# Patient Record
Sex: MALE | Race: WHITE | NOT HISPANIC OR LATINO
[De-identification: names, ages, dates, MRNs, and addresses within clinical notes are randomized per-mention and may not be internally consistent; named-entity substitution may affect disease eponyms.]

---

## 2019-11-19 PROBLEM — Z00.00 ENCOUNTER FOR PREVENTIVE HEALTH EXAMINATION: Status: ACTIVE | Noted: 2019-11-19

## 2019-11-21 ENCOUNTER — APPOINTMENT (OUTPATIENT)
Dept: MRI IMAGING | Facility: IMAGING CENTER | Age: 54
End: 2019-11-21
Payer: COMMERCIAL

## 2019-11-21 ENCOUNTER — OUTPATIENT (OUTPATIENT)
Dept: OUTPATIENT SERVICES | Facility: HOSPITAL | Age: 54
LOS: 1 days | End: 2019-11-21
Payer: COMMERCIAL

## 2019-11-21 DIAGNOSIS — Z00.8 ENCOUNTER FOR OTHER GENERAL EXAMINATION: ICD-10-CM

## 2019-11-21 PROCEDURE — 70552 MRI BRAIN STEM W/DYE: CPT

## 2019-11-21 PROCEDURE — A9585: CPT

## 2019-11-21 PROCEDURE — 70552 MRI BRAIN STEM W/DYE: CPT | Mod: 26

## 2021-03-23 ENCOUNTER — OUTPATIENT (OUTPATIENT)
Dept: OUTPATIENT SERVICES | Facility: HOSPITAL | Age: 56
LOS: 1 days | End: 2021-03-23
Payer: COMMERCIAL

## 2021-03-23 ENCOUNTER — APPOINTMENT (OUTPATIENT)
Dept: MRI IMAGING | Facility: IMAGING CENTER | Age: 56
End: 2021-03-23
Payer: COMMERCIAL

## 2021-03-23 DIAGNOSIS — Z00.8 ENCOUNTER FOR OTHER GENERAL EXAMINATION: ICD-10-CM

## 2021-03-23 PROCEDURE — 73721 MRI JNT OF LWR EXTRE W/O DYE: CPT | Mod: 26,RT

## 2021-03-23 PROCEDURE — 73721 MRI JNT OF LWR EXTRE W/O DYE: CPT

## 2023-04-01 ENCOUNTER — APPOINTMENT (OUTPATIENT)
Dept: MRI IMAGING | Facility: IMAGING CENTER | Age: 58
End: 2023-04-01
Payer: COMMERCIAL

## 2023-04-01 ENCOUNTER — OUTPATIENT (OUTPATIENT)
Dept: OUTPATIENT SERVICES | Facility: HOSPITAL | Age: 58
LOS: 1 days | End: 2023-04-01
Payer: COMMERCIAL

## 2023-04-01 DIAGNOSIS — Z00.8 ENCOUNTER FOR OTHER GENERAL EXAMINATION: ICD-10-CM

## 2023-04-01 PROCEDURE — 73721 MRI JNT OF LWR EXTRE W/O DYE: CPT

## 2023-04-01 PROCEDURE — 73721 MRI JNT OF LWR EXTRE W/O DYE: CPT | Mod: 26,LT

## 2023-07-02 ENCOUNTER — NON-APPOINTMENT (OUTPATIENT)
Age: 58
End: 2023-07-02

## 2023-07-03 ENCOUNTER — APPOINTMENT (OUTPATIENT)
Dept: ORTHOPEDIC SURGERY | Facility: CLINIC | Age: 58
End: 2023-07-03
Payer: COMMERCIAL

## 2023-07-03 VITALS — BODY MASS INDEX: 25.77 KG/M2 | WEIGHT: 180 LBS | HEIGHT: 70 IN

## 2023-07-03 DIAGNOSIS — M23.92 UNSPECIFIED INTERNAL DERANGEMENT OF LEFT KNEE: ICD-10-CM

## 2023-07-03 DIAGNOSIS — M17.12 UNILATERAL PRIMARY OSTEOARTHRITIS, LEFT KNEE: ICD-10-CM

## 2023-07-03 PROCEDURE — 99204 OFFICE O/P NEW MOD 45 MIN: CPT

## 2023-07-03 NOTE — HISTORY OF PRESENT ILLNESS
[de-identified] : 58 year old male presents complaining of left knee pain that started in March 2023. He denies specific injury. He states that he has been quite active, including cycling. He is unsure if this activity contributed to his pain. He states that his pain was significant initially, especially on the lateral aspect of his knee. He had pain with activity. He denies having buckling or swelling. He saw an Orthopedist, Dr. Sepulveda, who gave him a cortisone injection in March. It has provided significant relief. He was sent for an MRI on 04/01/23. He was told that he has a lateral meniscal tear. He is unsure whether surgery is indicated but he would prefer to wait until the Fall. As of today, he is not experiencing pain. He was able to do several long distance bike rides over the past couple of weeks without any issues.Of note, he had an arthroscopy in each knee many years ago. \par PMHx: blood thinner, one stent \par \par MRI of the LEFT knee obtained at [facility] on 04/01/23 revealed:\par 1. Horizontal tear of the lateral meniscus involving the posterior horn, body, and anterior horn. Multiloculated parameniscal cysts extend into the anterolateral joint line.\par 2. Small knee joint effusion.\par 3. Osteochondral lesion of the anterior medial femoral condyle with adjacent cartilage delamination.\par 4. Postsurgical changes to the posterior horn of the medial meniscus with intrasubstance degeneration.

## 2023-07-03 NOTE — PHYSICAL EXAM
[de-identified] : Constitutional\par o Appearance : well-nourished, well developed, alert, in no acute distress \par Head and Face\par o Head :\par ¦ Inspection : atraumatic, normocephalic\par o Face :\par ¦ Inspection : no visible rash or discoloration\par Respiratory\par o Respiratory Effort: breathing unlabored \par Neurologic\par o Mental Status Examination :\par ¦ Orientation : grossly oriented to person, place and time\par Psychiatric\par o Mood and Affect: mood normal, affect appropriate \par \par Right Lower Extremity\par o Buttock : no tenderness, swelling or deformities \par o Right Hip :\par ¦ Inspection/Palpation : no tenderness, swelling or deformities\par ¦ Range of Motion : full and painless in all planes, no crepitance\par ¦ Stability : joint stability intact\par ¦ Strength : extension, flexion, adduction, abduction, internal rotation and external rotation 5/5 \par \par o Right Knee :\par ¦ Inspection/Palpation : no tenderness to palpation, no swelling, well-healed arthroscopic portals \par ¦ Range of Motion : 0-140 ctive flexion and extension full and painless, no crepitance\par ¦ Stability : no valgus or varus instability present on provocative testing\par ¦ Strength : flexion and extension 5/5\par ¦ Tests and Signs : Anterior Drawer negative, Lachman negative, Rebecca's negative\par \par Left Lower Extremity\par o Buttock : no tenderness, swelling or deformities \par o Left Hip :\par ¦ Inspection/Palpation : no tenderness, no swelling or deformities\par ¦ Range of Motion : full and painless in all planes, no crepitance\par ¦ Stability : joint stability intact\par ¦ Strength : extension, flexion, adduction, abduction, internal rotation and external rotation 5/5\par \par o Left Knee :\par ¦ Inspection/Palpation : no medial or lateral compartment  tenderness to palpation, no swelling\par ¦ Range of Motion : active flexion and extension full and painless, no crepitance, good patellofemoral glide \par ¦ Stability : no valgus or varus instability present on provocative testing\par ¦ Strength : flexion and extension 5/5\par ¦ Tests and Signs : Anterior Drawer negative, Lachman negative, Rebecca's negative\par \par Gait: normal gait, no significant extremity swelling or lymphedema\par \par Radiology Results \par o Left Knee : Standing AP, lateral and tunnel views of the knee were obtained and revealed moderate lateral patellofemoral arthritis

## 2023-07-03 NOTE — DISCUSSION/SUMMARY
[de-identified] : I discussed the underlying pathophysiology of the patient's condition in great detail with the patient. I went over the patient's x-rays with them in great detail. At-home strengthening exercises were discussed and demonstrated with the patient. He needs to avoid high-impact activities such as running and jumping or riding a treadmill. I recommend alternative activities such as riding a stationary bike or elliptical on low tension. He should focus on light weight and high repetition exercises. He should avoid squatting and kneeling. The benefits of pool exercises were discussed in detail with the patient. We discussed the use of ice, Tylenol and anti-inflammatories to relieve pain. No surgical intervention is recommended at this time. Viscosupplementation was discussed as a solution to the patient's symptoms and a booklet with information was provided. \par \par All of their questions were answered. They understand and consent to the plan. \par \par FU in 4 to 6 weeks.

## 2023-07-03 NOTE — ADDENDUM
[FreeTextEntry1] : I, NEMOWILLIAM GILLIAM, acted solely as a scribe for Dr. Elías Luong on this date 07/03/2023.\par \par All medical record entries made by the Scribe were at my, Dr. Elías Luong, direction and personally dictated by me on 07/03/2023. I have reviewed the chart and agree that the record accurately reflects my personal performance of the history, physical exam, assessment and plan. I have also personally directed, reviewed, and agreed with the chart.

## 2023-11-11 ENCOUNTER — OFFICE (OUTPATIENT)
Dept: URBAN - METROPOLITAN AREA CLINIC 90 | Facility: CLINIC | Age: 58
Setting detail: OPHTHALMOLOGY
End: 2023-11-11
Payer: COMMERCIAL

## 2023-11-11 DIAGNOSIS — H25.12: ICD-10-CM

## 2023-11-11 PROCEDURE — 92012 INTRM OPH EXAM EST PATIENT: CPT | Performed by: OPHTHALMOLOGY

## 2023-11-11 ASSESSMENT — SPHEQUIV_DERIVED
OS_SPHEQUIV: -9.125
OS_SPHEQUIV: -9
OS_SPHEQUIV: -9.125
OD_SPHEQUIV: 4.875
OD_SPHEQUIV: -9.125
OD_SPHEQUIV: -9.125

## 2023-11-11 ASSESSMENT — REFRACTION_CURRENTRX
OD_SPHERE: -8.50
OD_AXIS: 004
OD_CYLINDER: -1.00
OD_AXIS: 165
OS_CYLINDER: -0.75
OS_AXIS: 165
OD_CYLINDER: -0.75
OS_OVR_VA: 20/
OS_CYLINDER: -0.75
OS_OVR_VA: 20/
OS_SPHERE: -8.75
OS_AXIS: 160
OD_OVR_VA: 20/
OD_CYLINDER: -1.00
OD_VPRISM_DIRECTION: SV
OS_VPRISM_DIRECTION: SV
OD_VPRISM_DIRECTION: SV
OD_SPHERE: -8.75
OS_SPHERE: -8.75
OD_AXIS: 168
OS_VPRISM_DIRECTION: SV
OD_OVR_VA: 20/
OD_OVR_VA: 20/
OS_OVR_VA: 20/
OS_VPRISM_DIRECTION: SV
OS_SPHERE: -8.75
OD_SPHERE: -8.75
OS_CYLINDER: -0.75
OS_AXIS: 172
OD_VPRISM_DIRECTION: SV

## 2023-11-11 ASSESSMENT — REFRACTION_MANIFEST
OD_AXIS: 168
OS_ADD: +2.25
OS_AXIS: 165
OS_VA1: 20/20-2
OD_AXIS: 080
OS_CYLINDER: SPH
OS_AXIS: 080
OD_SPHERE: -8.75
OS_SPHERE: -8.75
OD_CYLINDER: SPH
OS_CYLINDER: +0.75
OS_SPHERE: -9.50
OD_CYLINDER: -0.75
OS_CYLINDER: -0.75
OD_CYLINDER: +0.75
OD_SPHERE: -9.50
OD_SPHERE: +1.75
OD_VA1: 20/NI
OS_SPHERE: +1.75

## 2023-11-11 ASSESSMENT — REFRACTION_AUTOREFRACTION
OD_AXIS: 088
OS_AXIS: 4
OD_SPHERE: +5.00
OD_CYLINDER: -0.25
OS_CYLINDER: -1.00
OS_SPHERE: -8.50

## 2023-11-11 ASSESSMENT — CONFRONTATIONAL VISUAL FIELD TEST (CVF)
OS_FINDINGS: FULL
OD_FINDINGS: FULL

## 2023-11-11 ASSESSMENT — CORNEAL DYSTROPHY - BAND KERATOPATHY: OD_BANDKERATOPATHY: TEMPORAL 1+ NASAL

## 2024-04-17 ENCOUNTER — APPOINTMENT (OUTPATIENT)
Dept: UROLOGY | Facility: CLINIC | Age: 59
End: 2024-04-17
Payer: COMMERCIAL

## 2024-04-17 DIAGNOSIS — N13.9 BENIGN PROSTATIC HYPERPLASIA WITH LOWER URINARY TRACT SYMPMS: ICD-10-CM

## 2024-04-17 DIAGNOSIS — Z78.9 OTHER SPECIFIED HEALTH STATUS: ICD-10-CM

## 2024-04-17 DIAGNOSIS — Z80.42 FAMILY HISTORY OF MALIGNANT NEOPLASM OF PROSTATE: ICD-10-CM

## 2024-04-17 DIAGNOSIS — N41.9 INFLAMMATORY DISEASE OF PROSTATE, UNSPECIFIED: ICD-10-CM

## 2024-04-17 DIAGNOSIS — N41.1 CHRONIC PROSTATITIS: ICD-10-CM

## 2024-04-17 DIAGNOSIS — Z86.79 PERSONAL HISTORY OF OTHER DISEASES OF THE CIRCULATORY SYSTEM: ICD-10-CM

## 2024-04-17 DIAGNOSIS — N40.1 BENIGN PROSTATIC HYPERPLASIA WITH LOWER URINARY TRACT SYMPMS: ICD-10-CM

## 2024-04-17 PROCEDURE — 51700 IRRIGATION OF BLADDER: CPT

## 2024-04-17 PROCEDURE — A4216: CPT | Mod: NC

## 2024-04-17 PROCEDURE — 99205 OFFICE O/P NEW HI 60 MIN: CPT | Mod: 25

## 2024-04-17 RX ORDER — DOXYCYCLINE 100 MG/1
100 CAPSULE ORAL TWICE DAILY
Qty: 42 | Refills: 1 | Status: ACTIVE | COMMUNITY
Start: 2024-04-17 | End: 1900-01-01

## 2024-04-17 RX ORDER — TAMSULOSIN HYDROCHLORIDE 0.4 MG/1
CAPSULE ORAL
Refills: 0 | Status: ACTIVE | COMMUNITY

## 2024-04-17 RX ORDER — ATORVASTATIN CALCIUM 80 MG/1
TABLET, FILM COATED ORAL
Refills: 0 | Status: ACTIVE | COMMUNITY

## 2024-04-17 RX ORDER — RAMIPRIL 5 MG/1
CAPSULE ORAL
Refills: 0 | Status: ACTIVE | COMMUNITY

## 2024-04-17 RX ORDER — ASPIRIN 325 MG
TABLET ORAL
Refills: 0 | Status: ACTIVE | COMMUNITY

## 2024-04-17 RX ORDER — NEBIVOLOL 20 MG/1
TABLET ORAL
Refills: 0 | Status: ACTIVE | COMMUNITY

## 2024-04-17 NOTE — PHYSICAL EXAM
[Normal Appearance] : normal appearance [Well Groomed] : well groomed [General Appearance - In No Acute Distress] : no acute distress [Edema] : no peripheral edema [Respiration, Rhythm And Depth] : normal respiratory rhythm and effort [Exaggerated Use Of Accessory Muscles For Inspiration] : no accessory muscle use [Abdomen Soft] : soft [Abdomen Tenderness] : non-tender [Costovertebral Angle Tenderness] : no ~M costovertebral angle tenderness [Normal Station and Gait] : the gait and station were normal for the patient's age [] : no rash [No Focal Deficits] : no focal deficits [Oriented To Time, Place, And Person] : oriented to person, place, and time [Mood] : the mood was normal [Affect] : the affect was normal

## 2024-04-18 PROBLEM — N41.9 PROSTATITIS: Status: ACTIVE | Noted: 2024-04-18

## 2024-04-18 PROBLEM — N40.1 BENIGN LOCALIZED HYPERPLASIA OF PROSTATE WITH URINARY OBSTRUCTION AND LOWER URINARY TRACT SYMPTOMS: Status: ACTIVE | Noted: 2024-04-16

## 2024-04-18 LAB
APPEARANCE: CLEAR
BACTERIA: NEGATIVE /HPF
BILIRUBIN URINE: NEGATIVE
BLOOD URINE: NEGATIVE
CAST: 0 /LPF
COLOR: YELLOW
EPITHELIAL CELLS: 0 /HPF
GLUCOSE QUALITATIVE U: NEGATIVE MG/DL
KETONES URINE: NEGATIVE MG/DL
LEUKOCYTE ESTERASE URINE: NEGATIVE
MICROSCOPIC-UA: NORMAL
NITRITE URINE: NEGATIVE
PH URINE: 6.5
PROTEIN URINE: NEGATIVE MG/DL
RED BLOOD CELLS URINE: 0 /HPF
SPECIFIC GRAVITY URINE: 1.01
UROBILINOGEN URINE: 0.2 MG/DL
WHITE BLOOD CELLS URINE: 0 /HPF

## 2024-04-18 NOTE — ASSESSMENT
[FreeTextEntry1] : 04/17/2024: Mr. TANIKA WONG is a 58 y/o M that presents today for urologic evaluation. Has previously seen a urologist, Dr.Jeffrey Hunt, and presents for a second opinion. He reports his most recent PSA was 1.83. Is not on finasteride or dutasteride. Is on tamsulosin 0.4 mg once daily, unsure if it is helping as he complains of increased urinary frequency. Nocturia x0-1. Denies urinary urgency but has some burning if he holds it too long. Drinks about 1 quart of water a day. He reports that throughout his life he has had numerous episodes of prostatitis, helped by abx usually. He went a few years without any episodes, however about a year ago he had an episode of prostatitis that came out of nowhere that has not gone away. Continues to have mild burning. Has an uncomfortable sensation when he sits. The pain is between the scrotum and the anus. Denies any pain or discomfort in the penis. Denies weak stream. He was treated numerous times with antibiotics that have not helped. Patient does ride a bike often. The seat is fairly hard but has been putting cushion on it lately. Usually, will feel discomfort in the prostate a day after riding his bike. His symptoms are not worse during times of high stress. +FHx of prostate cancer (maternal uncle). Pt is an x-ray technician. He is on atorvastatin for cholesterol. Denies constipation. Pt is allergic to sulfa, got a rash on his hand.   The patient produced a urine sample which will be sent for urinalysis, urine cytology, and urine culture.    Reviewed and discussed lab work of 12/27/2023 which the patient presented to me today. 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 Creatinine was 1.17. Alk phos was normal at 72. PSA was very good at 1.83.    Patient was given a prescription for pelvic floor PT by his previous urologist for overactive bladder. Pt was discouraged by this and feels he does not have the time for it. Based off his symptoms and the fact that his pain is mostly localized in the perineum and not throughout the pelvis, I do not believe his problem will be helped by pelvic floor PT at this time, and we should hold off on it for now.   Continue tamsulosin 0.4 mg once daily. Pt states he takes it before breakfast, and he does not eat a big breakfast. Pt was advised to take it 15-30 minutes after dinner for maximum absorption and benefit. I had suggested we add Desipramine, however the pt was hesitant on starting more medications. He requested that we try one more course of antibiotics which I found to be reasonable as his symptoms could be due to a persistent low grade infection.   I prescribed the patient doxycycline 100 mg, one capsule every 12 hours x 3 weeks. I informed the patient to avoid excessive amounts of direct sunlight while on this medication. If this works well in clearing things up, we might consider prophylaxis with daily trimethoprim.   Patient will RTO in 3 weeks after completion of abx for a renal and bladder US.    Preparation, in person office visit, and coordination of care took 60 minutes.

## 2024-04-18 NOTE — HISTORY OF PRESENT ILLNESS
[FreeTextEntry1] : 04/17/2024: Mr. TANIKA WONG is a 58 y/o M that presents today for urologic evaluation. Has previously seen a urologist, Dr.Jeffrey Hunt, and presents for a second opinion. He reports his most recent PSA was 1.83. Is not on finasteride or dutasteride. Is on tamsulosin 0.4 mg once daily, unsure if it is helping as he complains of increased urinary frequency. Nocturia x0-1. Denies urinary urgency but has some burning if he holds it too long. Drinks about 1 quart of water a day. He reports that throughout his life he has had numerous episodes of prostatitis, helped by abx usually. He went a few years without any episodes, however about a year ago he had an episode of prostatitis that came out of nowhere that has not gone away. Continues to have mild burning. Has an uncomfortable sensation when he sits. The pain is between the scrotum and the anus. Denies any pain or discomfort in the penis. Denies weak stream. He was treated numerous times with antibiotics that have not helped. Patient does ride a bike often. The seat is fairly hard but has been putting cushion on it lately. Usually, will feel discomfort in the prostate a day after riding his bike. His symptoms are not worse during times of high stress. +FHx of prostate cancer (maternal uncle). Pt is an x-ray technician. He is on atorvastatin for cholesterol. Denies constipation. Pt is allergic to sulfa, got a rash on his hand.

## 2024-04-18 NOTE — ADDENDUM
[FreeTextEntry1] : This note was authored by Ariane Berg working as a scribe for Dr.Gary Marie. I, Dr. Jorden Marie have reviewed the content of this note and confirm it is true and accurate. I personally performed the history and physical examination and made all the decisions 04/17/2024

## 2024-04-20 LAB
BACTERIA UR CULT: NORMAL
URINE CYTOLOGY: NORMAL

## 2025-07-17 ENCOUNTER — OFFICE (OUTPATIENT)
Facility: LOCATION | Age: 60
Setting detail: OPHTHALMOLOGY
End: 2025-07-17
Payer: COMMERCIAL

## 2025-07-17 DIAGNOSIS — H33.312: ICD-10-CM

## 2025-07-17 DIAGNOSIS — H16.223: ICD-10-CM

## 2025-07-17 DIAGNOSIS — H25.12: ICD-10-CM

## 2025-07-17 DIAGNOSIS — H33.8: ICD-10-CM

## 2025-07-17 DIAGNOSIS — H52.7: ICD-10-CM

## 2025-07-17 DIAGNOSIS — H35.40: ICD-10-CM

## 2025-07-17 PROCEDURE — 92012 INTRM OPH EXAM EST PATIENT: CPT | Performed by: OPHTHALMOLOGY

## 2025-07-17 ASSESSMENT — REFRACTION_MANIFEST
OD_SPHERE: -9.50
OS_CYLINDER: -0.75
OS_VA1: 20/20-2
OD_CYLINDER: SPH
OD_AXIS: 168
OS_CYLINDER: SPH
OS_CYLINDER: +0.75
OS_SPHERE: -9.50
OS_AXIS: 080
OD_CYLINDER: +0.75
OS_ADD: +2.25
OD_SPHERE: +1.75
OS_SPHERE: +1.75
OD_CYLINDER: -0.75
OD_SPHERE: -8.75
OD_VA1: 20/NI
OS_AXIS: 165
OS_SPHERE: -8.75
OD_AXIS: 080

## 2025-07-17 ASSESSMENT — REFRACTION_CURRENTRX
OS_OVR_VA: 20/
OS_AXIS: 165
OD_AXIS: 168
OS_SPHERE: -8.75
OD_OVR_VA: 20/
OS_SPHERE: -8.75
OS_VPRISM_DIRECTION: SV
OD_SPHERE: -8.50
OS_OVR_VA: 20/
OS_CYLINDER: -1.00
OD_AXIS: 165
OS_AXIS: 172
OD_CYLINDER: -1.00
OD_AXIS: 170
OD_OVR_VA: 20/
OD_SPHERE: -8.75
OS_CYLINDER: -0.75
OD_VPRISM_DIRECTION: SV
OD_VPRISM_DIRECTION: SV
OS_VPRISM_DIRECTION: SV
OD_SPHERE: -8.75
OD_OVR_VA: 20/
OS_AXIS: 160
OS_CYLINDER: -0.75
OD_SPHERE: -8.75
OS_SPHERE: -8.75
OD_CYLINDER: -0.75
OS_CYLINDER: -0.75
OS_AXIS: 172
OD_VPRISM_DIRECTION: SV
OD_CYLINDER: -0.75
OS_VPRISM_DIRECTION: SV
OS_OVR_VA: 20/
OD_VPRISM_DIRECTION: SV
OS_VPRISM_DIRECTION: SV
OS_SPHERE: -8.75
OD_CYLINDER: -1.00
OD_AXIS: 004

## 2025-07-17 ASSESSMENT — PACHYMETRY
OD_CT_CORRECTION: -6
OD_CT_UM: 639
OS_CT_CORRECTION: -4
OS_CT_UM: 598

## 2025-07-17 ASSESSMENT — CONFRONTATIONAL VISUAL FIELD TEST (CVF)
OS_FINDINGS: FULL
OD_FINDINGS: FULL

## 2025-07-17 ASSESSMENT — CORNEAL DYSTROPHY - BAND KERATOPATHY: OD_BANDKERATOPATHY: TEMPORAL 1+ NASAL

## 2025-07-17 ASSESSMENT — KERATOMETRY
OS_AXISANGLE_DEGREES: 091
OD_K2POWER_DIOPTERS: 42.50
METHOD_AUTO_MANUAL: AUTO
OS_K2POWER_DIOPTERS: 43.50
OD_AXISANGLE_DEGREES: 089
OD_K1POWER_DIOPTERS: 41.75
OS_K1POWER_DIOPTERS: 42.25

## 2025-07-17 ASSESSMENT — REFRACTION_AUTOREFRACTION
OS_AXIS: 176
OS_SPHERE: -8.25
OD_CYLINDER: -0.75
OD_SPHERE: +5.00
OS_CYLINDER: -1.00
OD_AXIS: 111

## 2025-07-17 ASSESSMENT — VISUAL ACUITY
OS_BCVA: 500
OD_BCVA: 20/20-2

## 2025-07-17 ASSESSMENT — TEAR BREAK UP TIME (TBUT)
OS_TBUT: 2+
OD_TBUT: 1+

## 2025-07-17 ASSESSMENT — TONOMETRY
OS_IOP_MMHG: 20
OD_IOP_MMHG: 19
OD_IOP_MMHG: 18